# Patient Record
Sex: MALE | Race: WHITE | NOT HISPANIC OR LATINO | URBAN - NONMETROPOLITAN AREA
[De-identification: names, ages, dates, MRNs, and addresses within clinical notes are randomized per-mention and may not be internally consistent; named-entity substitution may affect disease eponyms.]

---

## 2017-05-17 ENCOUNTER — OFFICE VISIT - GICH (OUTPATIENT)
Dept: FAMILY MEDICINE | Facility: OTHER | Age: 32
End: 2017-05-17

## 2017-05-17 ENCOUNTER — HISTORY (OUTPATIENT)
Dept: FAMILY MEDICINE | Facility: OTHER | Age: 32
End: 2017-05-17

## 2017-05-17 DIAGNOSIS — H60.331 SWIMMER'S EAR OF RIGHT SIDE: ICD-10-CM

## 2017-05-17 DIAGNOSIS — J02.9 ACUTE PHARYNGITIS: ICD-10-CM

## 2017-05-17 LAB — STREP A ANTIGEN - HISTORICAL: NEGATIVE

## 2017-05-17 ASSESSMENT — PATIENT HEALTH QUESTIONNAIRE - PHQ9: SUM OF ALL RESPONSES TO PHQ QUESTIONS 1-9: 0

## 2018-01-05 NOTE — PROGRESS NOTES
Patient Information     Patient Name MRN Sex Gibran Galeano [id number] Male 1985      Progress Notes by Clinton Abarca MD at 2017 10:30 AM     Author:  Clinton Abarca MD Service:  (none) Author Type:  Physician     Filed:  2017 12:18 PM Encounter Date:  2017 Status:  Signed     :  Clinton Abarca MD (Physician)            SUBJECTIVE:    Gibran Currie is a 32 y.o. male who presents for sore throat    HPI Comments: Patient arrives here for sore throat. He reports a sore throats been going on for couple days. Seems to be getting worse. Pain does radiate to the right ear.      No Known Allergies, No family history on file.,   No current outpatient prescriptions on file prior to visit.     No current facility-administered medications on file prior to visit.    , No past surgical history on file.,   Social History      Substance Use Topics        Smoking status:  Current Every Day Smoker     Smokeless tobacco:  Never Used     Alcohol use  4.8 oz/week      8 Cans of beer per week      and   Social History      Substance Use Topics        Smoking status:  Current Every Day Smoker     Smokeless tobacco:  Never Used     Alcohol use  4.8 oz/week     8 Cans of beer per week        REVIEW OF SYSTEMS:  ROS    OBJECTIVE:  /68  Pulse 88  Temp 98.4  F (36.9  C) (Temporal)  Ht 1.829 m (6')  Wt 80.3 kg (177 lb)  BMI 24.01 kg/m2    EXAM:   Physical Exam   Constitutional:   Sick in appearance nontoxic   HENT:   Throat is red and injected. Right ear canal has some pus present.   Eyes: Pupils are equal, round, and reactive to light.   Neck: Normal range of motion.   Cardiovascular: Normal rate and regular rhythm.    Pulmonary/Chest: Effort normal and breath sounds normal.   Abdominal: Soft.   Lymphadenopathy:     He has cervical adenopathy (tender).       ASSESSMENT/PLAN:    ICD-10-CM    1. Sore throat J02.9 RAPID STREP WITH REFLEX CULTURE      RAPID STREP WITH REFLEX CULTURE       amoxicillin (AMOXIL) 250 mg capsule      HYDROcodone-acetaminophen, 5-325 mg, (NORCO) per tablet   2. Acute swimmer's ear of right side H60.331 neomycin-polymyxin-hydrocortisone (CORTISPORIN OTIC) otic suspension        Plan:  Patient does meet criteria for strep pharyngitis. Offered culture versus treatment. Patient desires to proceed with treatment.  Also start Cortisporin otic for his right otitis externa. Follow-up if getting worse.

## 2018-01-05 NOTE — NURSING NOTE
Patient Information     Patient Name MRN Gibran Ramirez [id number] Male 1985      Nursing Note by Giana Mccarthy at 2017 10:30 AM     Author:  Giana Mccarthy Service:  (none) Author Type:  (none)     Filed:  2017 10:36 AM Encounter Date:  2017 Status:  Signed     :  Giana Mccarthy            Patient here for sore throat day 3. Right ear is sore when he swallows and there is a lump on the right side of throat. Giana Mccarthy LPN .......................2017  10:33 AM

## 2018-01-26 VITALS
HEIGHT: 72 IN | HEART RATE: 88 BPM | WEIGHT: 177 LBS | SYSTOLIC BLOOD PRESSURE: 108 MMHG | BODY MASS INDEX: 23.98 KG/M2 | TEMPERATURE: 98.4 F | DIASTOLIC BLOOD PRESSURE: 68 MMHG

## 2018-02-03 ASSESSMENT — PATIENT HEALTH QUESTIONNAIRE - PHQ9: SUM OF ALL RESPONSES TO PHQ QUESTIONS 1-9: 0

## 2018-03-05 ENCOUNTER — DOCUMENTATION ONLY (OUTPATIENT)
Dept: FAMILY MEDICINE | Facility: OTHER | Age: 33
End: 2018-03-05

## 2018-03-05 RX ORDER — NEOMYCIN SULFATE, POLYMYXIN B SULFATE AND HYDROCORTISONE 10; 3.5; 1 MG/ML; MG/ML; [USP'U]/ML
3 SUSPENSION/ DROPS AURICULAR (OTIC) 4 TIMES DAILY
COMMUNITY
Start: 2017-05-17

## 2018-03-05 RX ORDER — HYDROCODONE BITARTRATE AND ACETAMINOPHEN 5; 325 MG/1; MG/1
1 TABLET ORAL EVERY 4 HOURS PRN
COMMUNITY
Start: 2017-05-17

## 2018-03-19 ENCOUNTER — OFFICE VISIT (OUTPATIENT)
Dept: FAMILY MEDICINE | Facility: OTHER | Age: 33
End: 2018-03-19
Attending: NURSE PRACTITIONER
Payer: COMMERCIAL

## 2018-03-19 VITALS
HEART RATE: 84 BPM | DIASTOLIC BLOOD PRESSURE: 84 MMHG | BODY MASS INDEX: 25.63 KG/M2 | SYSTOLIC BLOOD PRESSURE: 142 MMHG | WEIGHT: 189 LBS | TEMPERATURE: 97.5 F

## 2018-03-19 DIAGNOSIS — Z20.2 POSSIBLE EXPOSURE TO STD: Primary | ICD-10-CM

## 2018-03-19 LAB
C TRACH DNA SPEC QL PROBE+SIG AMP: NOT DETECTED
N GONORRHOEA DNA SPEC QL PROBE+SIG AMP: NOT DETECTED
SPECIMEN SOURCE: NORMAL

## 2018-03-19 PROCEDURE — 87591 N.GONORRHOEAE DNA AMP PROB: CPT | Performed by: NURSE PRACTITIONER

## 2018-03-19 PROCEDURE — 87491 CHLMYD TRACH DNA AMP PROBE: CPT | Performed by: NURSE PRACTITIONER

## 2018-03-19 PROCEDURE — 99213 OFFICE O/P EST LOW 20 MIN: CPT | Performed by: NURSE PRACTITIONER

## 2018-03-19 NOTE — NURSING NOTE
Patient presents to the clinic for std check. Patient reports having an std previously and believes it is still there. He was on medication for it and is not sure which std he tested positive for. He would also like to be seen for possible hep C testing as his girlfriend had acute hep C.  Roma COWART CMA.......3/19/2018..1:23 PM

## 2018-03-19 NOTE — PATIENT INSTRUCTIONS
What Are Sexually Transmitted Diseases (STDs)?  A sexually transmitted disease (STD) is a disease that is spread during sex. (An STD can also be called STI for sexually transmitted infection.) You can become infected with an STD if you have sex with someone who has an STD. Any sex that involves the penis, vagina, anus, or mouth can spread disease. Some STDs spread through body fluids such as semen, vaginal fluid, or blood. Others spread through contact with affected skin.  Who is at risk?     Places on or in the body where STDs cause damage include reproductive organs, the rectum, and the mouth.   It doesn t matter if you re straight or spencer, male or female, young or old. Any person who has sex can get an STD. Your risk increases if:    You have more than one partner. The more partners you have, the greater your risk.    Your partner has other partners. If your partner is exposed to an STD, you could be, too.    You or your partner have had sex with other people in the past. Either of you might be carrying an STD from an earlier partner.    You have an STD. The STD may cause sores or other health problems that increase your risk of new infections. Your risk will stay high unless you change the behaviors that put you at risk of the current infection.  Prevent future problems  Left untreated, certain STDs can lead to cancer or even death. Some can harm unborn babies whose mothers are infected. Others can cause you to not be able to have children (sterility) or can affect changes in behavior or your ability to think. You can prevent these problems with safer sex, regular checkups, and early treatment. Always use a latex condom when you have sex. Get tested if you re at risk. And get treated early if you have an STD.  Getting checked  The only sure way to know if you have an STD is to get checked by a healthcare provider. If you notice a change in how your body looks or feels, have it checked out. But keep in mind,  STDs don t always show symptoms. So if you re at risk of STDs, get checked regularly. If you find you have an STD, be sure your partner gets treatment, too. If not, his or her health is at risk. And left untreated, your partner could pass the STD back to you, or on to others.  Common symptoms  Be alert to any changes in your body and your partner s body. Symptoms may appear in or near the vagina, penis, rectum, mouth, or throat. They include:    Unusual discharge    Lumps, bumps, or rashes    Sores that may be painful, itchy, or painless    Itchy skin    Burning with urination    Pain in the pelvis, belly (abdomen), or rectum  Even if you don t have symptoms  You may have an STD, even if you don t have symptoms. If you think you are at risk, get checked. Go to a clinic or to your healthcare provider. If your partner has an STD, you need to be tested too, even if you feel fine.  Vaccines to prevent disease  Vaccines (also called immunizations) are available to prevent hepatitis A and hepatitis B. These are two kinds of STDs. There is also a vaccine to prevent HPV. This is a virus that can be passed from person to person through sexual contact. Ask your healthcare provider whether any of these vaccines is right for you.   Date Last Reviewed: 11/1/2016 2000-2017 [employer]. [address] [place] [address], [place] [address]. All rights reserved. This information is not intended as a substitute for professional medical care. Always follow your healthcare professional's instructions.

## 2018-03-19 NOTE — MR AVS SNAPSHOT
After Visit Summary   3/19/2018    Gibran Currie    MRN: [id number]           Patient Information     Date Of Birth          1985        Visit Information        Provider Department      3/19/2018 1:00 PM Anaya Jones NP [facility]        Today's Diagnoses     Possible exposure to STD    -  1      Care Instructions      What Are Sexually Transmitted Diseases (STDs)?  A sexually transmitted disease (STD) is a disease that is spread during sex. (An STD can also be called STI for sexually transmitted infection.) You can become infected with an STD if you have sex with someone who has an STD. Any sex that involves the penis, vagina, anus, or mouth can spread disease. Some STDs spread through body fluids such as semen, vaginal fluid, or blood. Others spread through contact with affected skin.  Who is at risk?     Places on or in the body where STDs cause damage include reproductive organs, the rectum, and the mouth.   It doesn t matter if you re straight or spencer, male or female, young or old. Any person who has sex can get an STD. Your risk increases if:    You have more than one partner. The more partners you have, the greater your risk.    Your partner has other partners. If your partner is exposed to an STD, you could be, too.    You or your partner have had sex with other people in the past. Either of you might be carrying an STD from an earlier partner.    You have an STD. The STD may cause sores or other health problems that increase your risk of new infections. Your risk will stay high unless you change the behaviors that put you at risk of the current infection.  Prevent future problems  Left untreated, certain STDs can lead to cancer or even death. Some can harm unborn babies whose mothers are infected. Others can cause you to not be able to have children (sterility) or can affect changes in behavior or your ability to think. You can prevent these problems with  safer sex, regular checkups, and early treatment. Always use a latex condom when you have sex. Get tested if you re at risk. And get treated early if you have an STD.  Getting checked  The only sure way to know if you have an STD is to get checked by a healthcare provider. If you notice a change in how your body looks or feels, have it checked out. But keep in mind, STDs don t always show symptoms. So if you re at risk of STDs, get checked regularly. If you find you have an STD, be sure your partner gets treatment, too. If not, his or her health is at risk. And left untreated, your partner could pass the STD back to you, or on to others.  Common symptoms  Be alert to any changes in your body and your partner s body. Symptoms may appear in or near the vagina, penis, rectum, mouth, or throat. They include:    Unusual discharge    Lumps, bumps, or rashes    Sores that may be painful, itchy, or painless    Itchy skin    Burning with urination    Pain in the pelvis, belly (abdomen), or rectum  Even if you don t have symptoms  You may have an STD, even if you don t have symptoms. If you think you are at risk, get checked. Go to a clinic or to your healthcare provider. If your partner has an STD, you need to be tested too, even if you feel fine.  Vaccines to prevent disease  Vaccines (also called immunizations) are available to prevent hepatitis A and hepatitis B. These are two kinds of STDs. There is also a vaccine to prevent HPV. This is a virus that can be passed from person to person through sexual contact. Ask your healthcare provider whether any of these vaccines is right for you.   Date Last Reviewed: 11/1/2016 2000-2017 [employer]. [address] [place] [address], [place] [address]. All rights reserved. This information is not intended as a substitute for professional medical care. Always follow your healthcare professional's instructions.                Follow-ups after your visit        Who to contact   "   If you have questions or need follow up information about today's clinic visit or your schedule please contact [facility] directly at [phone].  Normal or non-critical lab and imaging results will be communicated to you by MyChart, letter or phone within 4 business days after the clinic has received the results. If you do not hear from us within 7 days, please contact the clinic through [employer] or phone. If you have a critical or abnormal lab result, we will notify you by phone as soon as possible.  Submit refill requests through [employer] or call your pharmacy and they will forward the refill request to us. Please allow 3 business days for your refill to be completed.          Additional Information About Your Visit        [employer] Information     [employer] lets you send messages to your doctor, view your test results, renew your prescriptions, schedule appointments and more. To sign up, go to [email or web address] . Click on \"Log in\" on the left side of the screen, which will take you to the Welcome page. Then click on \"Sign up Now\" on the right side of the page.     You will be asked to enter the access code listed below, as well as some personal information. Please follow the directions to create your username and password.     Your access code is: [email or web address]  Expires: 2018  1:44 PM     Your access code will  in 90 days. If you need help or a new code, please call your [place] clinic or [phone].        Care EveryWhere ID     This is your Care EveryWhere ID. This could be used by other organizations to access your [place] medical records  [id number]        Your Vitals Were     Pulse Temperature BMI (Body Mass Index)             84 97.5  F (36.4  C) (Tympanic) 25.63 kg/m2          Blood Pressure from Last 3 Encounters:   18 148/90   17 108/68    Weight from Last 3 Encounters:   18 189 lb (85.7 kg)   17 177 lb (80.3 kg)              We " Performed the Following     GC/Chlamydia by PCR - [facility],        Primary Care Provider Fax #    Physician No Ref-Primary [phone]       No address on file        Equal Access to Services     NIMCO WYMAN : Hadii aad ku hadgeetabrain Rayo, marcellojose amayamodestoha, wild angelicaalexus west, agustín mary bethin hayaan rozkatelynn hurtado jose manuel yarbrough. So [facility] [phone].    ATENCIÓN: Si habla español, tiene a chowdhury disposición servicios gratuitos de asistencia lingüística. Llame al [phone].    We comply with applicable federal civil rights laws and [place] laws. We do not discriminate on the basis of race, color, national origin, age, disability, sex, sexual orientation, or gender identity.            Thank you!     Thank you for choosing [facility] AND [facility]  for your care. Our goal is always to provide you with excellent care. Hearing back from our patients is one way we can continue to improve our services. Please take a few minutes to complete the written survey that you may receive in the mail after your visit with us. Thank you!             Your Updated Medication List - Protect others around you: Learn how to safely use, store and throw away your medicines at www.disposemymeds.org.          This list is accurate as of 3/19/18  1:44 PM.  Always use your most recent med list.                   Brand Name Dispense Instructions for use Diagnosis    HYDROcodone-acetaminophen 5-325 MG per tablet    NORCO     Take 1 tablet by mouth every 4 hours as needed for pain        neomycin-polymyxin-hydrocortisone 3.5-40543-2 otic suspension    CORTISPORIN     Place 3 drops into both ears 4 times daily

## 2018-03-19 NOTE — PROGRESS NOTES
HPI:    Gibran Currie is a 33 year old male  who presents to clinic today for STD testing.   States he tested positive for trich about 1-2 months ago along with his current partner, both reported to be treated, neither have had retesting.  He reports she is not having any symptoms.  His symptoms include clear to yellow discharge for the past week with some irritation.  Denies dysuria, frequency or urgency.  No blood in urine.  No fevers or chills.  No nausea, vomiting or abdominal pain.  Denies any rash or lesions.   No sore throat or mouth lesions, sores.    States his exgirlfriend notified him of a positive hepatitis result and suggested he get tested.  He declines testing today as he always passes out with blood draws so appt is scheduled for next week in main clinic with family practice.        History reviewed. No pertinent past medical history.  History reviewed. No pertinent surgical history.  Social History   Substance Use Topics     Smoking status: Current Every Day Smoker     Smokeless tobacco: Never Used     Alcohol use 4.8 oz/week     Current Outpatient Prescriptions   Medication Sig Dispense Refill     HYDROcodone-acetaminophen (NORCO) 5-325 MG per tablet Take 1 tablet by mouth every 4 hours as needed for pain       neomycin-polymyxin-hydrocortisone (CORTISPORIN) 3.5-85563-4 otic suspension Place 3 drops into both ears 4 times daily       No Known Allergies      Past medical history, past surgical history, current medications and allergies reviewed and accurate to the best of my knowledge.        ROS:  Refer to HPI    /90 (BP Location: Left arm, Patient Position: Sitting, Cuff Size: Adult Regular)  Pulse 84  Temp 97.5  F (36.4  C) (Tympanic)  Wt 189 lb (85.7 kg)  BMI 25.63 kg/m2    EXAM:  General Appearance: Well appearing adult male, appropriate appearance for age. No acute distress  Eyes: conjunctivae normal without erythema or irritation, no drainage or crusting, no eyelid swelling, pupils  equal   Orophayrnx: moist mucous membranes, posterior pharynx without erythema, tonsils without hypertrophy, no erythema, no exudates or petechiae, no post nasal drip seen.    Neck: supple without adenopathy  Respiratory: normal chest wall and respirations.  Normal effort.  Clear to auscultation bilaterally, no wheezing, crackles or rhonchi.  No increased work of breathing.  No cough appreciated.   Cardiac: RRR with no murmurs  Genital:  Patient declines exam  Musculoskeletal:  Normal gait.  Equal movement of bilateral upper extremities.  Equal movement of bilateral lower extremities.    Dermatological: no rashes noted of exposed skin  Psychological: normal affect, alert and pleasant      Labs:  Results for orders placed or performed in visit on 03/19/18   GC/Chlamydia by PCR - [facility]   Result Value Ref Range    Specimen Source Unspecified Urine     Neisseria gonorrhoreae PCR Not Detected NDET^Not Detected    Chlamydia Trachomatis PCR Not Detected NDET^Not Detected             ASSESSMENT/PLAN:    ICD-10-CM    1. Possible exposure to STD Z20.2 GC/Chlamydia by PCR - [facility]         Gonorrhea test - negative  Chlamydia test - negative    Patient has concerns about trichomonas infection - testing in males is not always accurate, would recommend having female partner tested and if positive Gibran could be treated.      Safe sex practices, condom use    Follow up if symptoms persist or worsen or concerns  Follow up appt schedule for 3/26/18 for blood draw for STD testing as patient passes out when he has blood drawn.